# Patient Record
Sex: FEMALE | Race: BLACK OR AFRICAN AMERICAN | Employment: UNEMPLOYED | URBAN - METROPOLITAN AREA
[De-identification: names, ages, dates, MRNs, and addresses within clinical notes are randomized per-mention and may not be internally consistent; named-entity substitution may affect disease eponyms.]

---

## 2017-02-18 ENCOUNTER — HOSPITAL ENCOUNTER (EMERGENCY)
Age: 37
Discharge: HOME | End: 2017-02-19
Attending: EMERGENCY MEDICINE
Payer: OTHER GOVERNMENT

## 2017-02-18 DIAGNOSIS — S91.209A AVULSED TOENAIL, INITIAL ENCOUNTER: Primary | ICD-10-CM

## 2017-02-18 DIAGNOSIS — S39.012A BACK STRAIN, INITIAL ENCOUNTER: ICD-10-CM

## 2017-02-18 PROCEDURE — 99283 EMERGENCY DEPT VISIT LOW MDM: CPT

## 2017-02-19 ENCOUNTER — APPOINTMENT (OUTPATIENT)
Dept: GENERAL RADIOLOGY | Age: 37
End: 2017-02-19
Attending: EMERGENCY MEDICINE
Payer: OTHER GOVERNMENT

## 2017-02-19 VITALS
SYSTOLIC BLOOD PRESSURE: 125 MMHG | TEMPERATURE: 98 F | DIASTOLIC BLOOD PRESSURE: 89 MMHG | OXYGEN SATURATION: 100 % | HEIGHT: 64 IN | WEIGHT: 163 LBS | HEART RATE: 89 BPM | BODY MASS INDEX: 27.83 KG/M2 | RESPIRATION RATE: 18 BRPM

## 2017-02-19 LAB — HCG UR QL: NEGATIVE

## 2017-02-19 PROCEDURE — 81025 URINE PREGNANCY TEST: CPT | Performed by: EMERGENCY MEDICINE

## 2017-02-19 PROCEDURE — 72110 X-RAY EXAM L-2 SPINE 4/>VWS: CPT

## 2017-02-19 PROCEDURE — 73660 X-RAY EXAM OF TOE(S): CPT

## 2017-02-19 PROCEDURE — 72072 X-RAY EXAM THORAC SPINE 3VWS: CPT

## 2017-02-19 RX ORDER — TRAMADOL HYDROCHLORIDE 50 MG/1
50 TABLET ORAL
Qty: 15 TAB | Refills: 0 | Status: SHIPPED | OUTPATIENT
Start: 2017-02-19 | End: 2017-12-18 | Stop reason: ALTCHOICE

## 2017-02-19 NOTE — ED NOTES
I have reviewed discharge instructions with the patient. The patient verbalized understanding. Patient armband removed and shredded  Current Discharge Medication List      START taking these medications    Details   traMADol (ULTRAM) 50 mg tablet Take 1 Tab by mouth every six (6) hours as needed for Pain. Max Daily Amount: 200 mg. Qty: 15 Tab, Refills: 0         CONTINUE these medications which have NOT CHANGED    Details   LORazepam (ATIVAN) 0.5 mg tablet Take 0.5 mg by mouth every eight (8) hours as needed for Anxiety. lamoTRIgine (LAMICTAL) 25 mg tablet Take 200 mg by mouth daily.

## 2017-02-19 NOTE — ED PROVIDER NOTES
HPI Comments: 11:47 PM Kenn Mabry is a 39 y.o. female with h/o bipolar disorder and ADD who presents to ED complaining of R 4th toe pain after tripping down the stairs and hitting her toe on the railing. Pt is also complaining of mid back pain secondary to fall. Pt denies any other symptoms at this time. PCP: Not On File B[facility]      The history is provided by the patient. No  was used. Past Medical History:   Diagnosis Date    ADD (attention deficit disorder)     Bipolar affective disorder ([facility] [address]        History reviewed. No pertinent past surgical history. History reviewed. No pertinent family history. Social History     Social History    Marital status:      Spouse name: N/A    Number of children: N/A    Years of education: N/A     Occupational History    Not on file. Social History Main Topics    Smoking status: Never Smoker    Smokeless tobacco: Not on file    Alcohol use Not on file    Drug use: Not on file    Sexual activity: Not on file     Other Topics Concern    Not on file     Social History Narrative         ALLERGIES: Review of patient's allergies indicates no known allergies. Review of Systems   Constitutional: Negative for chills and fever. HENT: Negative for congestion. Respiratory: Negative for cough and shortness of breath. Cardiovascular: Negative for chest pain. Gastrointestinal: Negative for abdominal pain, diarrhea, nausea and vomiting. Genitourinary: Negative for dysuria and hematuria. Musculoskeletal: Positive for back pain (mid back). Skin: Positive for wound (R 4th toe ). Neurological: Negative for headaches. All other systems reviewed and are negative. Vitals:    02/18/17 2344   BP: 132/90   Pulse: 96   Resp: 19   Temp: 98 °F (36.7 °C)   SpO2: 100%   Weight: 73.9 kg (163 lb)   Height: 5' 4\" (1.626 m)            Physical Exam   Constitutional: She is oriented to person, place, and time.  She appears well-developed and well-nourished. HENT:   Head: Normocephalic and atraumatic. Neck: Neck supple. No JVD present. Cardiovascular: Normal rate and regular rhythm. Pulmonary/Chest: Effort normal and breath sounds normal. No respiratory distress. Abdominal: Soft. She exhibits no distension. There is no tenderness. There is no rebound and no guarding. Musculoskeletal: She exhibits no edema. Feet:    Tenderness to 4th toe, involves toenail; normal pulses and sensory. BACK: (+) minimal tenderness over T10 to L2 spinal process. Normal SLR and Aguilar test.  Sensory and normal vascular - normal in legs. Neurological: She is alert and oriented to person, place, and time. Skin: Skin is warm and dry. No erythema. Psychiatric: Judgment normal.        MDM  Number of Diagnoses or Management Options  Avulsed toenail, initial encounter: new and requires workup  Back strain, initial encounter: new and requires workup     Amount and/or Complexity of Data Reviewed  Tests in the radiology section of CPT®: ordered and reviewed    Risk of Complications, Morbidity, and/or Mortality  Presenting problems: moderate  Diagnostic procedures: high  Management options: moderate    Patient Progress  Patient progress: improved    ED Course       Procedures    Vitals:  Patient Vitals for the past 12 hrs:   Temp Pulse Resp BP SpO2   02/18/17 2344 98 °F (36.7 °C) 96 19 132/90 100 %       X-Ray, CT or other radiology findings or impressions:  XR SPINE LUMB MIN 4 V    (Results Pending)   XR TOES RT 2 OR MORE    (Results Pending)   XR SPINE [address] 3 V    (Results Pending)       Progress notes, Consult notes or additional Procedure notes:  patient remained stable       Reevaluation of patient: patient is feeling betre and is ready for discharge.       Disposition:  Diagnosis: Contusion of toe with avulsed toenail, low back strain    Disposition: discharge    Follow-up Information     None           Patient's Medications Start Taking    No medications on file   Continue Taking    LAMOTRIGINE (LAMICTAL) 25 MG TABLET    Take 200 mg by mouth daily. LORAZEPAM (ATIVAN) 0.5 MG TABLET    Take 0.5 mg by mouth every eight (8) hours as needed for Anxiety. These Medications have changed    No medications on file   Stop Taking    No medications on file     Scribe Attestation  Yeni Crowe acting as a scribe for and in the presence of Maryam Reeves MD  February 18, 2017 at 11:54 PM       Provider Attestation:  I personally performed the services described in the documentation, reviewed the documentation, as recorded by the scribe in my presence, and it accurately and completely records my words and actions.   Maryam Reeves MD      Signed by: Yeni Larios, February 18, 2017 at 11:54 PM

## 2017-03-21 ENCOUNTER — HOSPITAL ENCOUNTER (OUTPATIENT)
Dept: CT IMAGING | Age: 37
Discharge: HOME | End: 2017-03-21
Attending: SURGERY
Payer: OTHER GOVERNMENT

## 2017-03-21 PROCEDURE — 72192 CT PELVIS W/O DYE: CPT

## 2017-03-24 ENCOUNTER — HOSPITAL ENCOUNTER (OUTPATIENT)
Dept: CT IMAGING | Age: 37
Discharge: HOME | End: 2017-03-24
Attending: RADIOLOGY | Admitting: RADIOLOGY
Payer: OTHER GOVERNMENT

## 2017-03-24 VITALS
OXYGEN SATURATION: 100 % | SYSTOLIC BLOOD PRESSURE: 105 MMHG | BODY MASS INDEX: 26.98 KG/M2 | HEIGHT: 64 IN | HEART RATE: 79 BPM | WEIGHT: 158 LBS | DIASTOLIC BLOOD PRESSURE: 76 MMHG | RESPIRATION RATE: 16 BRPM | TEMPERATURE: 97.8 F

## 2017-03-24 LAB
ANION GAP BLD CALC-SCNC: 7 MMOL/L (ref 3–18)
APPEARANCE FLD: ABNORMAL
APTT PPP: 33.1 SEC (ref 23–36.4)
BUN SERPL-MCNC: 8 MG/DL (ref 7–18)
BUN/CREAT SERPL: 16 (ref 12–20)
CALCIUM SERPL-MCNC: 8.8 MG/DL (ref 8.5–10.1)
CHLORIDE SERPL-SCNC: 104 MMOL/L (ref 100–108)
CO2 SERPL-SCNC: 31 MMOL/L (ref 21–32)
COLOR FLD: ABNORMAL
CREAT SERPL-MCNC: 0.5 MG/DL (ref 0.6–1.3)
EOSINOPHIL # FLD: 0 %
ERYTHROCYTE [DISTWIDTH] IN BLOOD BY AUTOMATED COUNT: 15.5 % (ref 11.6–14.5)
GLUCOSE SERPL-MCNC: 80 MG/DL (ref 74–99)
HCG UR QL: NEGATIVE
HCT VFR BLD AUTO: 34.3 % (ref 35–45)
HGB BLD-MCNC: 10.6 G/DL (ref 12–16)
INR PPP: 1 (ref 0.8–1.2)
LYMPHOCYTES NFR FLD: 22 %
MCH RBC QN AUTO: 28.6 PG (ref 24–34)
MCHC RBC AUTO-ENTMCNC: 30.9 G/DL (ref 31–37)
MCV RBC AUTO: 92.5 FL (ref 74–97)
MONOCYTES NFR FLD: 8 %
NEUTS BAND # FLD: 0 %
NEUTS SEG NFR FLD: 70 %
NUC CELL # FLD: 8250 /CU MM
PLATELET # BLD AUTO: 341 K/UL (ref 135–420)
PMV BLD AUTO: 9.4 FL (ref 9.2–11.8)
POTASSIUM SERPL-SCNC: 3.6 MMOL/L (ref 3.5–5.5)
PROTHROMBIN TIME: 13.1 SEC (ref 11.5–15.2)
RBC # BLD AUTO: 3.71 M/UL (ref 4.2–5.3)
RBC # FLD: ABNORMAL /CU MM
SODIUM SERPL-SCNC: 142 MMOL/L (ref 136–145)
SPECIMEN SOURCE FLD: ABNORMAL
WBC # BLD AUTO: 3.4 K/UL (ref 4.6–13.2)

## 2017-03-24 PROCEDURE — 75989 ABSCESS DRAINAGE UNDER X-RAY: CPT

## 2017-03-24 PROCEDURE — 89051 BODY FLUID CELL COUNT: CPT | Performed by: RADIOLOGY

## 2017-03-24 PROCEDURE — 87102 FUNGUS ISOLATION CULTURE: CPT | Performed by: RADIOLOGY

## 2017-03-24 PROCEDURE — 87116 MYCOBACTERIA CULTURE: CPT | Performed by: RADIOLOGY

## 2017-03-24 PROCEDURE — 80048 BASIC METABOLIC PNL TOTAL CA: CPT | Performed by: RADIOLOGY

## 2017-03-24 PROCEDURE — 87070 CULTURE OTHR SPECIMN AEROBIC: CPT | Performed by: RADIOLOGY

## 2017-03-24 PROCEDURE — 85610 PROTHROMBIN TIME: CPT | Performed by: RADIOLOGY

## 2017-03-24 PROCEDURE — 74011250636 HC RX REV CODE- 250/636: Performed by: RADIOLOGY

## 2017-03-24 PROCEDURE — 85027 COMPLETE CBC AUTOMATED: CPT | Performed by: RADIOLOGY

## 2017-03-24 PROCEDURE — 81025 URINE PREGNANCY TEST: CPT

## 2017-03-24 PROCEDURE — 85730 THROMBOPLASTIN TIME PARTIAL: CPT | Performed by: RADIOLOGY

## 2017-03-24 PROCEDURE — 87075 CULTR BACTERIA EXCEPT BLOOD: CPT | Performed by: RADIOLOGY

## 2017-03-24 PROCEDURE — 87076 CULTURE ANAEROBE IDENT EACH: CPT | Performed by: RADIOLOGY

## 2017-03-24 RX ORDER — SODIUM CHLORIDE 9 MG/ML
20 INJECTION, SOLUTION INTRAVENOUS CONTINUOUS
Status: DISCONTINUED | OUTPATIENT
Start: 2017-03-24 | End: 2017-03-24 | Stop reason: HOSPADM

## 2017-03-24 RX ORDER — MIDAZOLAM HYDROCHLORIDE 1 MG/ML
1 INJECTION, SOLUTION INTRAMUSCULAR; INTRAVENOUS
Status: DISCONTINUED | OUTPATIENT
Start: 2017-03-24 | End: 2017-03-24 | Stop reason: HOSPADM

## 2017-03-24 RX ORDER — FENTANYL CITRATE 50 UG/ML
12.5-5 INJECTION, SOLUTION INTRAMUSCULAR; INTRAVENOUS
Status: DISCONTINUED | OUTPATIENT
Start: 2017-03-24 | End: 2017-03-24 | Stop reason: HOSPADM

## 2017-03-24 RX ADMIN — FENTANYL CITRATE 50 MCG: 50 INJECTION INTRAMUSCULAR; INTRAVENOUS at 12:00

## 2017-03-24 RX ADMIN — MIDAZOLAM HYDROCHLORIDE 1 MG: 1 INJECTION, SOLUTION INTRAMUSCULAR; INTRAVENOUS at 11:50

## 2017-03-24 RX ADMIN — MIDAZOLAM HYDROCHLORIDE 1 MG: 1 INJECTION, SOLUTION INTRAMUSCULAR; INTRAVENOUS at 12:00

## 2017-03-24 RX ADMIN — FENTANYL CITRATE 50 MCG: 50 INJECTION INTRAMUSCULAR; INTRAVENOUS at 11:50

## 2017-03-24 RX ADMIN — SODIUM CHLORIDE 20 ML/HR: 900 INJECTION, SOLUTION INTRAVENOUS at 10:27

## 2017-03-24 NOTE — PROCEDURES
DATE: 3/24/17    PROCEDURE(S):  1. CT-guided aspiration of abdominal wall fluid collection    INDICATION: 30-year-old female. History of anterior abdominal wall fluid collection, for which postoperative seroma is suspected. Percutaneous aspiration requested. TECHNIQUE: Expected benefits, potential risks, and alternatives to the procedure were discussed with the patient (and/or surrogate decision maker, as applicable) and all questions were answered. Discussed risks include - but are not limited to - bleeding, infection, visceral injury, catheter dysfunction, and medication reaction. Informed consent was obtained. The patient was placed in the CT scanner, the overlying skin was prepared in usual sterile fashion, and the ensuing procedure was performed with full barrier precaution, including caps, masks, gowns, gloves, and drapes. All CT scans at this facility are performed using dose optimization techniques as appropriate to a performed exam, to include automated exposure control, adjustment of the mA and/or kV according to patient size (including appropriate matching for site specific examinations), or use of iterative reconstruction technique. Procedure verification was completed. Moderate sedation was administered by qualified nursing personnel, who also monitored the patient throughout the procedure. Following local infiltration of 1% lidocaine, a dermal incision was made, through which a needle was inserted into the fluid collection. Position was confirmed by aspiration of fluid. An 035 guidewire was inserted, the tract was dilated, and a locking loop catheter was inserted over the guidewire. The guidewire was removed and the loop was formed/locked. Fluid was evacuated. The catheter was removed and hemostasis was achieved. A sterile dressing was applied. The patient tolerated the procedure well. ACCESS: Left abdomen (percutaneous)    FINDINGS:  1.  Successful percutaneous aspiration of abdominal wall fluid collection    : Nelly Lima MD    ASSISTANT(S): None    MEDICATION(S): Local lidocaine; moderate sedation    CONTRAST: None    SPECIMEN: Yes; approximately 30 mL serosanguineous fluid sent for culture/sensitivity and cell count    ESTIMATED BLOOD LOSS: Minimal    BLOOD ADMINISTERED: None    DRAIN(S): None    IMPLANT(S): None    COMPLICATION(S): None    IMPRESSION:  Technically successful aspiration of abdominal wall fluid collection

## 2017-03-24 NOTE — IP AVS SNAPSHOT
Justice Glover 
 
 
 [address] [facility] Patient: Shubham Lugo MRN: [id number] DIYA:[birth year] You are allergic to the following No active allergies Recent Documentation Height Weight BMI OB Status Smoking Status 1.626 m 71.7 kg 27.12 kg/m2 Having regular periods Never Assessed Emergency Contacts Name Discharge Info Relation Home Work Mobile Not At This,Time About your hospitalization You were admitted on:  March 24, 2017 You last received care in the:  [facility] You were discharged on:  March 24, 2017 Unit phone number:  [phone] Why you were hospitalized Your primary diagnosis was:  Not on File Providers Seen During Your Hospitalizations Provider Role Specialty Primary office phone Usha Weiner MD Attending Provider Radiology [phone] Your Primary Care Physician (PCP) Primary Care Physician Office Phone Office Fax Nany Dear P [phone] ** None ** Follow-up Information Follow up With Details Comments Contact Info Una Vallecillo MD   [address] [place] [id number] [phone] Andrey Monsalve MD  Arrange a one week follow up. Or go as scheduled. Call with any questions or concerns related to today's procedure. [address] 
[phone] Current Discharge Medication List  
  
Notice You have not been prescribed any medications. Discharge Instructions BIOPSY DISCHARGE INSTRUCTIONS General Biopsy: We are taking a specimen as ordered by your doctor. The risks include bleeding, pain, and infection. Home Care Instructions: You may resume your regular diet and medication regimen. Do not drink alcohol, drive, or make any important legal decisions in the next 24 hours.  Do not lift anything heavier than a gallon of milk until the soreness goes away. You may use over the counter acetaminophen or ibuprofen for the soreness. You may apply an ice pack to the affected area for 20-30 minutes at time for the first 24 hours. After that, you may apply a heat pack. Call If: You should call your Physician  if you have any questions or concerns about the biopsy site. Call if you should have increased pain, fever, redness, drainage, or bleeding more than a small spot on the bandage. Follow-Up Instructions: Please see your ordering doctor as he/she has requested. DISCHARGE SUMMARY from Nurse The following personal items are in your possession at time of discharge: 
 
Dental Appliances: None Home Medications: None Jewelry: None Clothing: Jacket/Coat, Footwear, Pants, Shirt, Socks, Undergarments Other Valuables: None PATIENT INSTRUCTIONS: 
 
After general anesthesia or intravenous sedation, for 24 hours or while taking prescription Narcotics: · Limit your activities · Do not drive and operate hazardous machinery · Do not make important personal or business decisions · Do  not drink alcoholic beverages · If you have not urinated within 8 hours after discharge, please contact your surgeon on call. Report the following to your surgeon: 
· Excessive pain, swelling, redness or odor of or around the surgical area · Temperature over 100.5 · Nausea and vomiting lasting longer than 4 hours or if unable to take medications · Any signs of decreased circulation or nerve impairment to extremity: change in color, persistent  numbness, tingling, coldness or increase pain · Any questions *  Please give a list of your current medications to your Primary Care Provider. *  Please update this list whenever your medications are discontinued, doses are 
    changed, or new medications (including over-the-counter products) are added.  
 
*  Please carry medication information at all times in case of emergency situations. These are general instructions for a healthy lifestyle: No smoking/ No tobacco products/ Avoid exposure to second hand smoke Surgeon General's Warning:  Quitting smoking now greatly reduces serious risk to your health. Obesity, smoking, and sedentary lifestyle greatly increases your risk for illness A healthy diet, regular physical exercise & weight monitoring are important for maintaining a healthy lifestyle You may be retaining fluid if you have a history of heart failure or if you experience any of the following symptoms:  Weight gain of 3 pounds or more overnight or 5 pounds in a week, increased swelling in our hands or feet or shortness of breath while lying flat in bed. Please call your doctor as soon as you notice any of these symptoms; do not wait until your next office visit. Recognize signs and symptoms of STROKE: 
 
F-face looks uneven A-arms unable to move or move unevenly S-speech slurred or non-existent T-time-call 911 as soon as signs and symptoms begin-DO NOT go Back to bed or wait to see if you get better-TIME IS BRAIN. Warning Signs of HEART ATTACK Call 911 if you have these symptoms: 
? Chest discomfort. Most heart attacks involve discomfort in the center of the chest that lasts more than a few minutes, or that goes away and comes back. It can feel like uncomfortable pressure, squeezing, fullness, or pain. ? Discomfort in other areas of the upper body. Symptoms can include pain or discomfort in one or both arms, the back, neck, jaw, or stomach. ? Shortness of breath with or without chest discomfort. ? Other signs may include breaking out in a cold sweat, nausea, or lightheadedness. Don't wait more than five minutes to call [address]! Fast action can save your life. Calling 911 is almost always the fastest way to get lifesaving treatment.  Emergency Medical Services staff can begin treatment when they arrive  up to an hour sooner than if someone gets to the hospital by car. The discharge information has been reviewed with the patient. The patient verbalized understanding. Discharge medications reviewed with the patient and appropriate educational materials and side effects teaching were provided. Discharge Orders None Introducing hospitals & HEALTH SERVICES! Sofia Montana introduces [employer] patient portal. Now you can access parts of your medical record, email your doctor's office, and request medication refills online. 1. In your internet browser, go to [email or web address]. [email or web address] 2. Click on the First Time User? Click Here link in the Sign In box. You will see the New Member Sign Up page. 3. Enter your [employer] Access Code exactly as it appears below. You will not need to use this code after youve completed the sign-up process. If you do not sign up before the expiration date, you must request a new code. · [employer] Access Code: [email or web address] Expires: 6/14/2017  3:18 PM 
 
4. Enter the last four digits of your Social Security Number (xxxx) and Date of Birth (mm/dd/yyyy) as indicated and click Submit. You will be taken to the next sign-up page. 5. Create a [employer] ID. This will be your [employer] login ID and cannot be changed, so think of one that is secure and easy to remember. 6. Create a [employer] password. You can change your password at any time. 7. Enter your Password Reset Question and Answer. This can be used at a later time if you forget your password. 8. Enter your e-mail address. You will receive e-mail notification when new information is available in [address] 9. Click Sign Up. You can now view and download portions of your medical record. 10. Click the Download Summary menu link to download a portable copy of your medical information.  
 
If you have questions, please visit the Frequently Asked Questions section of the [employer]. Remember, MyChart is NOT to be used for urgent needs. For medical emergencies, dial 911. Now available from your iPhone and Android! General Information Please provide this summary of care documentation to your next provider. Patient Signature:  ____________________________________________________________ Date:  ____________________________________________________________  
  
Burlene Jean Provider Signature:  ____________________________________________________________ Date:  ____________________________________________________________

## 2017-03-24 NOTE — DISCHARGE INSTRUCTIONS
BIOPSY DISCHARGE INSTRUCTIONS      General Biopsy: We are taking a specimen as ordered by your doctor. The risks include bleeding, pain, and infection. Home Care Instructions: You may resume your regular diet and medication regimen. Do not drink alcohol, drive, or make any important legal decisions in the next 24 hours. Do not lift anything heavier than a gallon of milk until the soreness goes away. You may use over the counter acetaminophen or ibuprofen for the soreness. You may apply an ice pack to the affected area for 20-30 minutes at time for the first 24 hours. After that, you may apply a heat pack. Call If: You should call your Physician  if you have any questions or concerns about the biopsy site. Call if you should have increased pain, fever, redness, drainage, or bleeding more than a small spot on the bandage. Follow-Up Instructions: Please see your ordering doctor as he/she has requested. DISCHARGE SUMMARY from Nurse    The following personal items are in your possession at time of discharge:    Dental Appliances: None        Home Medications: None  Jewelry: None  Clothing: Jacket/Coat, Footwear, Pants, Shirt, Socks, Undergarments  Other Valuables: None   PATIENT INSTRUCTIONS:    After general anesthesia or intravenous sedation, for 24 hours or while taking prescription Narcotics:  · Limit your activities  · Do not drive and operate hazardous machinery  · Do not make important personal or business decisions  · Do  not drink alcoholic beverages  · If you have not urinated within 8 hours after discharge, please contact your surgeon on call.     Report the following to your surgeon:  · Excessive pain, swelling, redness or odor of or around the surgical area  · Temperature over 100.5  · Nausea and vomiting lasting longer than 4 hours or if unable to take medications  · Any signs of decreased circulation or nerve impairment to extremity: change in color, persistent  numbness, tingling, coldness or increase pain  · Any questions      *  Please give a list of your current medications to your Primary Care Provider. *  Please update this list whenever your medications are discontinued, doses are      changed, or new medications (including over-the-counter products) are added. *  Please carry medication information at all times in case of emergency situations. These are general instructions for a healthy lifestyle:    No smoking/ No tobacco products/ Avoid exposure to second hand smoke    Surgeon General's Warning:  Quitting smoking now greatly reduces serious risk to your health. Obesity, smoking, and sedentary lifestyle greatly increases your risk for illness    A healthy diet, regular physical exercise & weight monitoring are important for maintaining a healthy lifestyle    You may be retaining fluid if you have a history of heart failure or if you experience any of the following symptoms:  Weight gain of 3 pounds or more overnight or 5 pounds in a week, increased swelling in our hands or feet or shortness of breath while lying flat in bed. Please call your doctor as soon as you notice any of these symptoms; do not wait until your next office visit. Recognize signs and symptoms of STROKE:    F-face looks uneven    A-arms unable to move or move unevenly    S-speech slurred or non-existent    T-time-call 911 as soon as signs and symptoms begin-DO NOT go       Back to bed or wait to see if you get better-TIME IS BRAIN. Warning Signs of HEART ATTACK     Call 911 if you have these symptoms:   Chest discomfort. Most heart attacks involve discomfort in the center of the chest that lasts more than a few minutes, or that goes away and comes back. It can feel like uncomfortable pressure, squeezing, fullness, or pain.  Discomfort in other areas of the upper body. Symptoms can include pain or discomfort in one or both arms, the back, neck, jaw, or stomach.    Shortness of breath with or without chest discomfort.  Other signs may include breaking out in a cold sweat, nausea, or lightheadedness. Don't wait more than five minutes to call 911 - MINUTES MATTER! Fast action can save your life. Calling 911 is almost always the fastest way to get lifesaving treatment. Emergency Medical Services staff can begin treatment when they arrive -- up to an hour sooner than if someone gets to the hospital by car. The discharge information has been reviewed with the patient. The patient verbalized understanding. Discharge medications reviewed with the patient and appropriate educational materials and side effects teaching were provided.

## 2017-03-24 NOTE — PERIOP NOTES
Patient armband removed and shredded    Patient confirmed by two identifiers with discharge instructions prior to being provided to patient.

## 2017-03-24 NOTE — H&P
INTERVENTIONAL RADIOLOGY OUTPATIENT HISTORY & PHYSICAL     March 24, 2017       4:37 PM     Indication/symptoms: Shubham Lugo is a 40 y.o. female who presents for aspiration of fluid collection. The medical record and relevant imaging were reviewed. Expected benefits, potential risks, and alternatives to the procedure were discussed with the patient (and/or surrogate decision maker, as applicable) and all questions were answered. Informed consent was obtained. Current medications:    Current Facility-Administered Medications   Medication Dose Route Frequency Provider Last Rate Last Dose    0.9% sodium chloride infusion  20 mL/hr IntraVENous CONTINUOUS Dominica Klinefelter, MD 20 mL/hr at 03/24/17 1027 20 mL/hr at 03/24/17 1027    fentaNYL citrate (PF) injection 12.5-50 mcg  12.5-50 mcg IntraVENous Multiple Dominica Klinefelter, MD   50 mcg at 03/24/17 1200    midazolam (VERSED) injection 1 mg  1 mg IntraVENous Multiple Dominica Klinefelter, MD   1 mg at 03/24/17 1200       Allergies:  No Known Allergies    Past medical history:  No past medical history on file.     Past surgical history:  Past Surgical History:   Procedure Laterality Date    HX ABDOMINOPLASTY  02/23/2017    HX MASTOPEXY (BREAST LIFT)  02/23/2017       Data:    Vital signs:   Visit Vitals    /76    Pulse 79    Temp 97.8 °F (36.6 °C)    Resp 16    Ht 5' 4\" (1.626 m)    Wt 71.7 kg (158 lb)    LMP 03/24/2017    SpO2 100%    BMI 27.12 kg/m2   :     Laboratory data:   Metabolic panel:  Lab Results   Component Value Date/Time    Sodium 142 03/24/2017 10:22 AM    Potassium 3.6 03/24/2017 10:22 AM    Chloride 104 03/24/2017 10:22 AM    CO2 31 03/24/2017 10:22 AM    BUN 8 03/24/2017 10:22 AM    Creatinine 0.50 03/24/2017 10:22 AM    Glucose 80 03/24/2017 10:22 AM     Complete blood count:  Lab Results   Component Value Date/Time    WBC 3.4 03/24/2017 10:22 AM    HGB 10.6 03/24/2017 10:22 AM    HCT 34.3 03/24/2017 10:22 AM    PLATELET 541 03/94/4774 10:22 AM     Coagulation parameters:  Lab Results   Component Value Date/Time    Prothrombin time 13.1 03/24/2017 10:22 AM    INR 1.0 03/24/2017 10:22 AM    aPTT 33.1 03/24/2017 10:22 AM          Physical examination:   General: Alert and oriented; no acute distress   Heart:     Regular rate/rhythm   Lungs:   Clear to ausculation bilaterally    The patient is an appropriate candidate to undergo the procedure and, if necessary, to receive moderate sedation/analgesia.     Carson Kumar MD

## 2017-03-29 LAB
BACTERIA SPEC CULT: ABNORMAL
BACTERIA SPEC CULT: ABNORMAL
GRAM STN SPEC: ABNORMAL
GRAM STN SPEC: ABNORMAL
SERVICE CMNT-IMP: ABNORMAL
SERVICE CMNT-IMP: ABNORMAL

## 2017-04-24 LAB
BACTERIA SPEC CULT: NORMAL
FUNGUS SMEAR,FNGSMR: NORMAL
SERVICE CMNT-IMP: NORMAL

## 2017-05-08 LAB
ACID FAST STN SPEC: NEGATIVE
MYCOBACTERIUM SPEC QL CULT: NEGATIVE
SPECIMEN PREPARATION: NORMAL
SPECIMEN SOURCE: NORMAL